# Patient Record
Sex: FEMALE | Race: WHITE | HISPANIC OR LATINO | URBAN - METROPOLITAN AREA
[De-identification: names, ages, dates, MRNs, and addresses within clinical notes are randomized per-mention and may not be internally consistent; named-entity substitution may affect disease eponyms.]

---

## 2023-06-13 ENCOUNTER — APPOINTMENT (RX ONLY)
Dept: URBAN - METROPOLITAN AREA CLINIC 71 | Facility: CLINIC | Age: 23
Setting detail: DERMATOLOGY
End: 2023-06-13

## 2023-06-13 DIAGNOSIS — L29.8 OTHER PRURITUS: ICD-10-CM

## 2023-06-13 DIAGNOSIS — Z71.89 OTHER SPECIFIED COUNSELING: ICD-10-CM

## 2023-06-13 DIAGNOSIS — L20.89 OTHER ATOPIC DERMATITIS: ICD-10-CM | Status: INADEQUATELY CONTROLLED

## 2023-06-13 DIAGNOSIS — L85.3 XEROSIS CUTIS: ICD-10-CM

## 2023-06-13 DIAGNOSIS — L29.89 OTHER PRURITUS: ICD-10-CM

## 2023-06-13 PROCEDURE — 99204 OFFICE O/P NEW MOD 45 MIN: CPT | Mod: 25

## 2023-06-13 PROCEDURE — ? PRESCRIPTION

## 2023-06-13 PROCEDURE — ? COUNSELING

## 2023-06-13 PROCEDURE — ? COUNSELING ALLERGENS

## 2023-06-13 PROCEDURE — ? INTRAMUSCULAR KENALOG

## 2023-06-13 PROCEDURE — ? ADDITIONAL NOTES

## 2023-06-13 PROCEDURE — ? PRESCRIPTION MEDICATION MANAGEMENT

## 2023-06-13 PROCEDURE — ? FULL BODY SKIN EXAM - DECLINED

## 2023-06-13 PROCEDURE — ? TREATMENT REGIMEN

## 2023-06-13 PROCEDURE — 96372 THER/PROPH/DIAG INJ SC/IM: CPT

## 2023-06-13 RX ORDER — CLOBETASOL PROPIONATE 0.5 MG/G
CREAM TOPICAL
Qty: 45 | Refills: 3 | Status: ERX | COMMUNITY
Start: 2023-06-13

## 2023-06-13 RX ADMIN — CLOBETASOL PROPIONATE: 0.5 CREAM TOPICAL at 00:00

## 2023-06-13 ASSESSMENT — LOCATION SIMPLE DESCRIPTION DERM
LOCATION SIMPLE: RIGHT FOREARM
LOCATION SIMPLE: LEFT POSTERIOR UPPER ARM
LOCATION SIMPLE: UPPER BACK
LOCATION SIMPLE: RIGHT POSTERIOR UPPER ARM
LOCATION SIMPLE: LEFT ANTERIOR NECK
LOCATION SIMPLE: RIGHT THIGH
LOCATION SIMPLE: POSTERIOR NECK
LOCATION SIMPLE: ABDOMEN
LOCATION SIMPLE: LEFT THIGH
LOCATION SIMPLE: RIGHT BUTTOCK
LOCATION SIMPLE: LEFT FOREARM

## 2023-06-13 ASSESSMENT — LOCATION DETAILED DESCRIPTION DERM
LOCATION DETAILED: LEFT DISTAL DORSAL FOREARM
LOCATION DETAILED: RIGHT PROXIMAL RADIAL DORSAL FOREARM
LOCATION DETAILED: RIGHT BUTTOCK
LOCATION DETAILED: EPIGASTRIC SKIN
LOCATION DETAILED: MID POSTERIOR NECK
LOCATION DETAILED: RIGHT ANTERIOR PROXIMAL THIGH
LOCATION DETAILED: LEFT INFERIOR ANTERIOR NECK
LOCATION DETAILED: LEFT ANTERIOR PROXIMAL THIGH
LOCATION DETAILED: INFERIOR THORACIC SPINE
LOCATION DETAILED: LEFT PROXIMAL POSTERIOR UPPER ARM
LOCATION DETAILED: RIGHT PROXIMAL POSTERIOR UPPER ARM

## 2023-06-13 ASSESSMENT — LOCATION ZONE DERM
LOCATION ZONE: TRUNK
LOCATION ZONE: NECK
LOCATION ZONE: LEG
LOCATION ZONE: ARM

## 2023-06-13 NOTE — PROCEDURE: MIPS QUALITY
Quality 226: Preventive Care And Screening: Tobacco Use: Screening And Cessation Intervention: Patient screened for tobacco use and is an ex/non-smoker
Quality 130: Documentation Of Current Medications In The Medical Record: Current Medications Documented
Detail Level: Detailed
Quality 47: Advance Care Plan: Advance Care Planning discussed and documented; advance care plan or surrogate decision maker documented in the medical record.
Quality 431: Preventive Care And Screening: Unhealthy Alcohol Use - Screening: Patient screened for unhealthy alcohol use using a single question and scores less than 2 times per year
Quality 226: Preventive Care And Screening: Tobacco Use: Screening And Cessation Intervention: Patient screened for tobacco use, is a smoker AND received Cessation Counseling within measurement period or in the six months prior to the measurement period
Quality 431: Preventive Care And Screening: Unhealthy Alcohol Use - Screening: Patient identified as an unhealthy alcohol user when screened for unhealthy alcohol use using a systematic screening method and received brief counseling
Quality 47: Advance Care Plan: Advance care planning not documented, reason not otherwise specified.

## 2023-06-13 NOTE — PROCEDURE: PRESCRIPTION MEDICATION MANAGEMENT
12-Apr-2023
Render In Strict Bullet Format?: No
Detail Level: Simple
Plan: Discuss the option of going on Adbry or Dupixent biologic
Initiate Treatment: clobetasol 0.05 % topical cream \\nSig: AAA eczema bid x 2 weeks prn

## 2023-08-01 ENCOUNTER — APPOINTMENT (RX ONLY)
Dept: URBAN - METROPOLITAN AREA CLINIC 71 | Facility: CLINIC | Age: 23
Setting detail: DERMATOLOGY
End: 2023-08-01

## 2023-08-01 VITALS — HEIGHT: 62 IN | WEIGHT: 170 LBS

## 2023-08-01 DIAGNOSIS — L29.89 OTHER PRURITUS: ICD-10-CM

## 2023-08-01 DIAGNOSIS — L20.89 OTHER ATOPIC DERMATITIS: ICD-10-CM

## 2023-08-01 DIAGNOSIS — L29.8 OTHER PRURITUS: ICD-10-CM

## 2023-08-01 DIAGNOSIS — Z71.89 OTHER SPECIFIED COUNSELING: ICD-10-CM

## 2023-08-01 PROCEDURE — ? ADBRY INITIATION

## 2023-08-01 PROCEDURE — ? INTRAMUSCULAR KENALOG

## 2023-08-01 PROCEDURE — ? COUNSELING

## 2023-08-01 PROCEDURE — ? PRESCRIPTION MEDICATION MANAGEMENT

## 2023-08-01 PROCEDURE — ? TREATMENT REGIMEN

## 2023-08-01 PROCEDURE — 96372 THER/PROPH/DIAG INJ SC/IM: CPT

## 2023-08-01 PROCEDURE — ? FULL BODY SKIN EXAM - DECLINED

## 2023-08-01 PROCEDURE — ? COUNSELING ALLERGENS

## 2023-08-01 PROCEDURE — ? ADDITIONAL NOTES

## 2023-08-01 PROCEDURE — 99214 OFFICE O/P EST MOD 30 MIN: CPT | Mod: 25

## 2023-08-01 ASSESSMENT — LOCATION DETAILED DESCRIPTION DERM
LOCATION DETAILED: MID POSTERIOR NECK
LOCATION DETAILED: RIGHT PROXIMAL POSTERIOR UPPER ARM
LOCATION DETAILED: RIGHT ANTERIOR PROXIMAL THIGH
LOCATION DETAILED: EPIGASTRIC SKIN
LOCATION DETAILED: LEFT PROXIMAL POSTERIOR UPPER ARM
LOCATION DETAILED: RIGHT BUTTOCK
LOCATION DETAILED: RIGHT PROXIMAL RADIAL DORSAL FOREARM
LOCATION DETAILED: LEFT BUTTOCK
LOCATION DETAILED: LEFT ANTERIOR PROXIMAL THIGH
LOCATION DETAILED: LEFT DISTAL DORSAL FOREARM
LOCATION DETAILED: INFERIOR THORACIC SPINE
LOCATION DETAILED: LEFT INFERIOR ANTERIOR NECK

## 2023-08-01 ASSESSMENT — LOCATION SIMPLE DESCRIPTION DERM
LOCATION SIMPLE: ABDOMEN
LOCATION SIMPLE: LEFT ANTERIOR NECK
LOCATION SIMPLE: POSTERIOR NECK
LOCATION SIMPLE: LEFT THIGH
LOCATION SIMPLE: UPPER BACK
LOCATION SIMPLE: LEFT POSTERIOR UPPER ARM
LOCATION SIMPLE: RIGHT THIGH
LOCATION SIMPLE: RIGHT FOREARM
LOCATION SIMPLE: RIGHT BUTTOCK
LOCATION SIMPLE: LEFT FOREARM
LOCATION SIMPLE: RIGHT POSTERIOR UPPER ARM
LOCATION SIMPLE: LEFT BUTTOCK

## 2023-08-01 ASSESSMENT — LOCATION ZONE DERM
LOCATION ZONE: TRUNK
LOCATION ZONE: ARM
LOCATION ZONE: LEG
LOCATION ZONE: NECK

## 2023-08-01 NOTE — PROCEDURE: PRESCRIPTION MEDICATION MANAGEMENT
Render In Strict Bullet Format?: No
Detail Level: Simple
Plan: Re-Discuss the option of going on Adbry or Dupixent biologic
Continue Regimen: clobetasol 0.05 % topical cream \\nSig: AAA eczema bid x 2 weeks prn

## 2023-08-01 NOTE — PROCEDURE: ADBRY INITIATION
Add Pregnancy And Lactation Warning To Medication Counseling?: No
Pregnancy And Lactation Warning Text: It is unknown if this medication will adversely affect pregnancy or breast feeding.
Detail Level: Zone
Diagnosis (Required): Atopic Dermatitis/Eczematous Dermatitis
Adbry Monitoring Guidelines: There is no laboratory monitoring requirement with Adbry.
Adbry Dosing: 600 mg SC day 0 then 300 mg SC every other week

## 2023-08-02 ENCOUNTER — RX ONLY (OUTPATIENT)
Age: 23
Setting detail: RX ONLY
End: 2023-08-02

## 2023-08-02 RX ORDER — TRALOKINUMAB-LDRM 150 MG/ML
INJECTION, SOLUTION SUBCUTANEOUS
Qty: 4 | Refills: 3 | Status: ERX | COMMUNITY
Start: 2023-08-02

## 2023-08-04 ENCOUNTER — APPOINTMENT (RX ONLY)
Dept: URBAN - METROPOLITAN AREA CLINIC 71 | Facility: CLINIC | Age: 23
Setting detail: DERMATOLOGY
End: 2023-08-04

## 2023-08-04 DIAGNOSIS — L20.89 OTHER ATOPIC DERMATITIS: ICD-10-CM

## 2023-08-04 PROCEDURE — ? FULL BODY SKIN EXAM - DECLINED

## 2023-08-04 PROCEDURE — ? COUNSELING

## 2023-08-04 PROCEDURE — ? ADDITIONAL NOTES

## 2023-08-04 PROCEDURE — 96372 THER/PROPH/DIAG INJ SC/IM: CPT

## 2023-08-04 PROCEDURE — ? COUNSELING ALLERGENS

## 2023-08-04 PROCEDURE — ? ADBRY INJECTION

## 2023-08-04 PROCEDURE — ? PRESCRIPTION MEDICATION MANAGEMENT

## 2023-08-04 ASSESSMENT — LOCATION DETAILED DESCRIPTION DERM
LOCATION DETAILED: LEFT INFERIOR ANTERIOR NECK
LOCATION DETAILED: LEFT DISTAL DORSAL FOREARM
LOCATION DETAILED: LEFT PROXIMAL POSTERIOR UPPER ARM
LOCATION DETAILED: INFERIOR THORACIC SPINE
LOCATION DETAILED: RIGHT ANTERIOR DISTAL THIGH
LOCATION DETAILED: RIGHT PROXIMAL RADIAL DORSAL FOREARM
LOCATION DETAILED: LEFT ANTERIOR PROXIMAL THIGH
LOCATION DETAILED: RIGHT PROXIMAL POSTERIOR UPPER ARM
LOCATION DETAILED: EPIGASTRIC SKIN
LOCATION DETAILED: MID POSTERIOR NECK
LOCATION DETAILED: LEFT ANTERIOR DISTAL THIGH
LOCATION DETAILED: RIGHT ANTERIOR PROXIMAL THIGH

## 2023-08-04 ASSESSMENT — LOCATION SIMPLE DESCRIPTION DERM
LOCATION SIMPLE: LEFT FOREARM
LOCATION SIMPLE: RIGHT POSTERIOR UPPER ARM
LOCATION SIMPLE: RIGHT THIGH
LOCATION SIMPLE: LEFT ANTERIOR NECK
LOCATION SIMPLE: ABDOMEN
LOCATION SIMPLE: UPPER BACK
LOCATION SIMPLE: POSTERIOR NECK
LOCATION SIMPLE: LEFT THIGH
LOCATION SIMPLE: RIGHT FOREARM
LOCATION SIMPLE: LEFT POSTERIOR UPPER ARM

## 2023-08-04 ASSESSMENT — LOCATION ZONE DERM
LOCATION ZONE: LEG
LOCATION ZONE: ARM
LOCATION ZONE: NECK
LOCATION ZONE: TRUNK

## 2023-08-04 NOTE — PROCEDURE: MIPS QUALITY
Quality 431: Preventive Care And Screening: Unhealthy Alcohol Use - Screening: Patient identified as an unhealthy alcohol user when screened for unhealthy alcohol use using a systematic screening method and received brief counseling
Detail Level: Detailed
Quality 47: Advance Care Plan: Advance care planning not documented, reason not otherwise specified.
Quality 130: Documentation Of Current Medications In The Medical Record: Current Medications Documented
Quality 226: Preventive Care And Screening: Tobacco Use: Screening And Cessation Intervention: Patient screened for tobacco use, is a smoker AND received Cessation Counseling within measurement period or in the six months prior to the measurement period

## 2023-08-17 ENCOUNTER — RX ONLY (OUTPATIENT)
Age: 23
Setting detail: RX ONLY
End: 2023-08-17

## 2023-08-17 RX ORDER — TRALOKINUMAB-LDRM 150 MG/ML
INJECTION, SOLUTION SUBCUTANEOUS
Qty: 4 | Refills: 4 | Status: ERX

## 2023-11-07 ENCOUNTER — APPOINTMENT (RX ONLY)
Dept: URBAN - METROPOLITAN AREA CLINIC 71 | Facility: CLINIC | Age: 23
Setting detail: DERMATOLOGY
End: 2023-11-07

## 2023-11-07 DIAGNOSIS — L20.89 OTHER ATOPIC DERMATITIS: ICD-10-CM

## 2023-11-07 PROCEDURE — 99214 OFFICE O/P EST MOD 30 MIN: CPT

## 2023-11-07 PROCEDURE — ? FULL BODY SKIN EXAM - DECLINED

## 2023-11-07 PROCEDURE — ? COUNSELING

## 2023-11-07 PROCEDURE — ? COUNSELING ALLERGENS

## 2023-11-07 PROCEDURE — ? PRESCRIPTION MEDICATION MANAGEMENT

## 2023-11-07 ASSESSMENT — LOCATION DETAILED DESCRIPTION DERM
LOCATION DETAILED: RIGHT PROXIMAL RADIAL DORSAL FOREARM
LOCATION DETAILED: LEFT INFERIOR ANTERIOR NECK
LOCATION DETAILED: EPIGASTRIC SKIN
LOCATION DETAILED: LEFT ANTERIOR PROXIMAL THIGH
LOCATION DETAILED: LEFT DISTAL DORSAL FOREARM
LOCATION DETAILED: RIGHT PROXIMAL POSTERIOR UPPER ARM
LOCATION DETAILED: INFERIOR THORACIC SPINE
LOCATION DETAILED: MID POSTERIOR NECK
LOCATION DETAILED: RIGHT ANTERIOR PROXIMAL THIGH
LOCATION DETAILED: LEFT PROXIMAL POSTERIOR UPPER ARM

## 2023-11-07 ASSESSMENT — LOCATION ZONE DERM
LOCATION ZONE: LEG
LOCATION ZONE: NECK
LOCATION ZONE: ARM
LOCATION ZONE: TRUNK

## 2023-11-07 ASSESSMENT — LOCATION SIMPLE DESCRIPTION DERM
LOCATION SIMPLE: LEFT POSTERIOR UPPER ARM
LOCATION SIMPLE: LEFT FOREARM
LOCATION SIMPLE: LEFT THIGH
LOCATION SIMPLE: RIGHT FOREARM
LOCATION SIMPLE: LEFT ANTERIOR NECK
LOCATION SIMPLE: POSTERIOR NECK
LOCATION SIMPLE: UPPER BACK
LOCATION SIMPLE: ABDOMEN
LOCATION SIMPLE: RIGHT THIGH
LOCATION SIMPLE: RIGHT POSTERIOR UPPER ARM

## 2023-11-07 NOTE — PROCEDURE: PRESCRIPTION MEDICATION MANAGEMENT
Render In Strict Bullet Format?: No
Detail Level: Simple
Plan: Pt brought her Adbry injections with her and asked for assistance with them since she was already here. Mr. Brand injected her in the abdomen. Pt found she liked the abdomen injection site better
Continue Regimen: clobetasol 0.05 % topical cream \\nSig: AAA eczema bid x 2 weeks prn\\n\\nAdbry injections
Modify Regimen: Pt was recommended to moisturize occasionally with Cetaphil, CeraVe, etc. Whichever the pt liked best

## 2024-02-07 ENCOUNTER — RX ONLY (OUTPATIENT)
Age: 24
Setting detail: RX ONLY
End: 2024-02-07

## 2024-02-07 RX ORDER — TRALOKINUMAB-LDRM 150 MG/ML
INJECTION, SOLUTION SUBCUTANEOUS
Qty: 2 | Refills: 3 | Status: ERX

## 2024-06-03 ENCOUNTER — RX ONLY (OUTPATIENT)
Age: 24
Setting detail: RX ONLY
End: 2024-06-03

## 2024-06-03 RX ORDER — CLOBETASOL PROPIONATE CREAM USP, 0.05% 0.5 MG/G
CREAM TOPICAL
Qty: 60 | Refills: 0 | Status: ERX